# Patient Record
Sex: FEMALE | Race: BLACK OR AFRICAN AMERICAN | NOT HISPANIC OR LATINO | Employment: UNEMPLOYED | URBAN - METROPOLITAN AREA
[De-identification: names, ages, dates, MRNs, and addresses within clinical notes are randomized per-mention and may not be internally consistent; named-entity substitution may affect disease eponyms.]

---

## 2019-06-26 ENCOUNTER — CLINICAL SUPPORT (OUTPATIENT)
Dept: PULMONOLOGY | Facility: CLINIC | Age: 54
End: 2019-06-26
Payer: MEDICARE

## 2019-06-26 ENCOUNTER — TELEPHONE (OUTPATIENT)
Dept: PULMONOLOGY | Facility: CLINIC | Age: 54
End: 2019-06-26

## 2019-06-26 ENCOUNTER — HOSPITAL ENCOUNTER (OUTPATIENT)
Dept: RADIOLOGY | Facility: HOSPITAL | Age: 54
Discharge: HOME | End: 2019-06-26
Attending: INTERNAL MEDICINE
Payer: MEDICARE

## 2019-06-26 DIAGNOSIS — J45.909 ASTHMA, UNSPECIFIED ASTHMA SEVERITY, UNSPECIFIED WHETHER COMPLICATED, UNSPECIFIED WHETHER PERSISTENT: ICD-10-CM

## 2019-06-26 DIAGNOSIS — J45.909 ASTHMA, UNSPECIFIED ASTHMA SEVERITY, UNSPECIFIED WHETHER COMPLICATED, UNSPECIFIED WHETHER PERSISTENT: Primary | ICD-10-CM

## 2019-06-26 PROCEDURE — 71046 XR CHEST PA AND LATERAL: ICD-10-PCS | Mod: 26,,, | Performed by: RADIOLOGY

## 2019-06-26 PROCEDURE — 94010 BREATHING CAPACITY TEST: CPT | Mod: PBBFAC

## 2019-06-26 PROCEDURE — 94375 RESPIRATORY FLOW VOLUME LOOP: CPT | Mod: 26,S$PBB,, | Performed by: INTERNAL MEDICINE

## 2019-06-26 PROCEDURE — 94375 RESPIRATORY FLOW VOLUME LOOP: CPT | Mod: PBBFAC

## 2019-06-26 PROCEDURE — 94375 PR RESPIRATORY FLOW VOLUME LOOP: ICD-10-PCS | Mod: 26,S$PBB,, | Performed by: INTERNAL MEDICINE

## 2019-06-26 PROCEDURE — 71046 X-RAY EXAM CHEST 2 VIEWS: CPT | Mod: 26,,, | Performed by: RADIOLOGY

## 2019-06-26 PROCEDURE — 71046 X-RAY EXAM CHEST 2 VIEWS: CPT | Mod: TC

## 2019-06-27 LAB
BRPFT: NORMAL
FEF 25 75 LLN: 1.02
FEF 25 75 PRE REF: 96 %
FEF 25 75 REF: 2.13
FEV1 FVC LLN: 70
FEV1 FVC PRE REF: 99.9 %
FEV1 FVC REF: 81
FEV1 LLN: 1.53
FEV1 PRE REF: 88.2 %
FEV1 REF: 2.03
FVC LLN: 1.91
FVC PRE REF: 88.1 %
FVC REF: 2.51
PEF LLN: 3.73
PEF PRE REF: 114.2 %
PEF REF: 5.48
PRE FEF 25 75: 2.04 L/S (ref 1.02–3.24)
PRE FET 100: 8 SEC
PRE FEV1 FVC: 81.29 % (ref 70.33–92.36)
PRE FEV1: 1.79 L (ref 1.53–2.54)
PRE FVC: 2.21 L (ref 1.91–3.11)
PRE PEF: 6.26 L/S (ref 3.73–7.24)

## 2019-06-28 ENCOUNTER — LAB VISIT (OUTPATIENT)
Dept: LAB | Facility: HOSPITAL | Age: 54
End: 2019-06-28
Attending: INTERNAL MEDICINE
Payer: MEDICARE

## 2019-06-28 ENCOUNTER — OFFICE VISIT (OUTPATIENT)
Dept: PULMONOLOGY | Facility: CLINIC | Age: 54
End: 2019-06-28
Payer: MEDICARE

## 2019-06-28 VITALS
OXYGEN SATURATION: 95 % | RESPIRATION RATE: 18 BRPM | HEIGHT: 60 IN | HEART RATE: 60 BPM | BODY MASS INDEX: 24.94 KG/M2 | WEIGHT: 127 LBS | SYSTOLIC BLOOD PRESSURE: 134 MMHG | DIASTOLIC BLOOD PRESSURE: 80 MMHG

## 2019-06-28 DIAGNOSIS — R06.00 DYSPNEA AND RESPIRATORY ABNORMALITIES: Primary | ICD-10-CM

## 2019-06-28 DIAGNOSIS — I42.9 CARDIOMYOPATHY, UNSPECIFIED TYPE: ICD-10-CM

## 2019-06-28 DIAGNOSIS — R06.00 DYSPNEA AND RESPIRATORY ABNORMALITIES: ICD-10-CM

## 2019-06-28 DIAGNOSIS — G47.10 HYPERSOMNOLENCE DISORDER: ICD-10-CM

## 2019-06-28 DIAGNOSIS — J45.909 ASTHMA, UNSPECIFIED ASTHMA SEVERITY, UNSPECIFIED WHETHER COMPLICATED, UNSPECIFIED WHETHER PERSISTENT: ICD-10-CM

## 2019-06-28 DIAGNOSIS — Z90.5 SINGLE KIDNEY: ICD-10-CM

## 2019-06-28 DIAGNOSIS — J38.3 VOCAL CORD DYSFUNCTION: ICD-10-CM

## 2019-06-28 DIAGNOSIS — R06.89 DYSPNEA AND RESPIRATORY ABNORMALITIES: Primary | ICD-10-CM

## 2019-06-28 DIAGNOSIS — R06.89 DYSPNEA AND RESPIRATORY ABNORMALITIES: ICD-10-CM

## 2019-06-28 DIAGNOSIS — Z85.3 HISTORY OF LEFT BREAST CANCER: ICD-10-CM

## 2019-06-28 DIAGNOSIS — Z86.718 HISTORY OF DVT IN ADULTHOOD: ICD-10-CM

## 2019-06-28 PROBLEM — I10 HYPERTENSION: Status: ACTIVE | Noted: 2019-06-28

## 2019-06-28 LAB
BASOPHILS # BLD AUTO: 0.05 K/UL (ref 0–0.2)
BASOPHILS NFR BLD: 0.7 % (ref 0–1.9)
D DIMER PPP IA.FEU-MCNC: 0.32 MG/L FEU
DIFFERENTIAL METHOD: ABNORMAL
EOSINOPHIL # BLD AUTO: 0.1 K/UL (ref 0–0.5)
EOSINOPHIL NFR BLD: 1 % (ref 0–8)
ERYTHROCYTE [DISTWIDTH] IN BLOOD BY AUTOMATED COUNT: 13.7 % (ref 11.5–14.5)
HCT VFR BLD AUTO: 40.3 % (ref 37–48.5)
HGB BLD-MCNC: 12.5 G/DL (ref 12–16)
IGE SERPL-ACNC: <35 IU/ML (ref 0–100)
IMM GRANULOCYTES # BLD AUTO: 0.03 K/UL (ref 0–0.04)
IMM GRANULOCYTES NFR BLD AUTO: 0.4 % (ref 0–0.5)
LYMPHOCYTES # BLD AUTO: 1.9 K/UL (ref 1–4.8)
LYMPHOCYTES NFR BLD: 27.3 % (ref 18–48)
MCH RBC QN AUTO: 26.8 PG (ref 27–31)
MCHC RBC AUTO-ENTMCNC: 31 G/DL (ref 32–36)
MCV RBC AUTO: 86 FL (ref 82–98)
MONOCYTES # BLD AUTO: 0.6 K/UL (ref 0.3–1)
MONOCYTES NFR BLD: 8.3 % (ref 4–15)
NEUTROPHILS # BLD AUTO: 4.3 K/UL (ref 1.8–7.7)
NEUTROPHILS NFR BLD: 62.7 % (ref 38–73)
NRBC BLD-RTO: 0 /100 WBC
PLATELET # BLD AUTO: 222 K/UL (ref 150–350)
PMV BLD AUTO: 10.3 FL (ref 9.2–12.9)
RBC # BLD AUTO: 4.67 M/UL (ref 4–5.4)
WBC # BLD AUTO: 6.85 K/UL (ref 3.9–12.7)

## 2019-06-28 PROCEDURE — 99999 PR PBB SHADOW E&M-EST. PATIENT-LVL IV: CPT | Mod: PBBFAC,,, | Performed by: INTERNAL MEDICINE

## 2019-06-28 PROCEDURE — 85025 COMPLETE CBC W/AUTO DIFF WBC: CPT

## 2019-06-28 PROCEDURE — 82785 ASSAY OF IGE: CPT

## 2019-06-28 PROCEDURE — 99214 OFFICE O/P EST MOD 30 MIN: CPT | Mod: PBBFAC | Performed by: INTERNAL MEDICINE

## 2019-06-28 PROCEDURE — 99205 PR OFFICE/OUTPT VISIT, NEW, LEVL V, 60-74 MIN: ICD-10-PCS | Mod: S$PBB,,, | Performed by: INTERNAL MEDICINE

## 2019-06-28 PROCEDURE — 99999 PR PBB SHADOW E&M-EST. PATIENT-LVL IV: ICD-10-PCS | Mod: PBBFAC,,, | Performed by: INTERNAL MEDICINE

## 2019-06-28 PROCEDURE — 85379 FIBRIN DEGRADATION QUANT: CPT

## 2019-06-28 PROCEDURE — 36415 COLL VENOUS BLD VENIPUNCTURE: CPT

## 2019-06-28 PROCEDURE — 99205 OFFICE O/P NEW HI 60 MIN: CPT | Mod: S$PBB,,, | Performed by: INTERNAL MEDICINE

## 2019-06-28 RX ORDER — BUDESONIDE AND FORMOTEROL FUMARATE DIHYDRATE 160; 4.5 UG/1; UG/1
2 AEROSOL RESPIRATORY (INHALATION) EVERY 12 HOURS
COMMUNITY

## 2019-06-28 RX ORDER — ATORVASTATIN CALCIUM 20 MG/1
20 TABLET, FILM COATED ORAL
COMMUNITY

## 2019-06-28 RX ORDER — DULOXETIN HYDROCHLORIDE 20 MG/1
20 CAPSULE, DELAYED RELEASE ORAL DAILY
COMMUNITY

## 2019-06-28 RX ORDER — HYDROCODONE BITARTRATE AND ACETAMINOPHEN 10; 325 MG/1; MG/1
1 TABLET ORAL EVERY 6 HOURS PRN
COMMUNITY
Start: 2017-09-04

## 2019-06-28 RX ORDER — LEVALBUTEROL INHALATION SOLUTION 1.25 MG/3ML
SOLUTION RESPIRATORY (INHALATION)
Refills: 0 | COMMUNITY
Start: 2019-06-06 | End: 2020-03-31 | Stop reason: SDUPTHER

## 2019-06-28 RX ORDER — GABAPENTIN 100 MG/1
CAPSULE ORAL
COMMUNITY

## 2019-06-28 RX ORDER — LEVALBUTEROL TARTRATE 45 UG/1
1-2 AEROSOL, METERED ORAL EVERY 4 HOURS PRN
COMMUNITY
End: 2020-03-31 | Stop reason: SDUPTHER

## 2019-06-28 NOTE — PROGRESS NOTES
Referring Provider:    Marcial Hart Md  [address] [facility] [place] [address]  Subjective:   Patient: Sole Gomez [id number], :1965   Visit date:2019 11:44 AM    Chief Complaint:  Other (pt was referred by dr Hart for possible vocal cord disfunction.)    HPI:  Sole is a 53 y.o. female who I was asked to see in consultation for evaluation of chronic throat discomfort:      Severity: moderate  Quality: dull  Voice Quality: hoarse, weak  Duration: 5 year(s)  Modifying factors:  None  Associated signs and symptoms:  Dysphagia - liquids/ heartburn  Post nasal drip or significant rhinorrhea:  No  Bad taste in the back of the mouth: No  Heartburn: Yes - takes otc tums  Number of cups of caffeinated beverages daily: 0  Number of alcoholic beverages daily: 0  Smoking: No  Prior medical therapy:    - None - which has been ineffective.    C/o dysphagia - regurgitates liquids up  C/o chronic hoarseness and weakness in her voice, sometimes worse in AM. She does not sing (in a choir)  Never smoked  Reported dyspnea with exertion       Vocal cord dysfunction       Has been told of diagnosis by ENT  Went to Speech therapy  > 5 years ago          Review of Systems:  Negative unless checked off.  Gen:  []fever   []fatigue  HENT:  []nosebleeds  []dental problem   Eyes:  []photophobia  []visual disturbance  Resp:  []chest tightness []wheezing  Card:  []chest pain  []leg swelling  GI:  []abdominal pain []blood in stool  :  []dysuria  []hematuria  Musc:  []joint swelling  []gait problem  Skin:  []color change  []pallor  Neuro:  []seizures  []numbness  Hem:  []bruise/bleed easily  Psych:  []hallucinations  []behavioral problems  Allergy/Imm: is allergic to albuterol sulfate; ondansetron hcl (pf); and oxycodone-acetaminophen.    Her meds, allergies, medical, surgical, social & family histories were reviewed & updated:  -     She has a current medication list which includes the following  prescription(s): atorvastatin, budesonide-formoterol 160-4.5 mcg, duloxetine, gabapentin, hydrocodone-acetaminophen, levalbuterol, levalbuterol, and pantoprazole.  -     She  has a past medical history of Asthma.   -     She does not have any pertinent problems on file.   -     She  has no past surgical history on file.  -     She  reports that she has never smoked. She does not have any smokeless tobacco history on file. She reports that she does not drink alcohol.  -     Her family history includes Cancer in her father; Scleroderma in her mother.  -     She is allergic to albuterol sulfate; ondansetron hcl (pf); and oxycodone-acetaminophen.    Objective:   Physical Exam:  Vitals:  /76   Pulse 78   Temp 97.5 °F (36.4 °C) (Tympanic)   Wt 57.4 kg (126 lb 8.7 oz)   BMI 24.71 kg/m²   General appearance:  Well developed, well nourished    Eyes:  Extraocular motions intact, PERRL    Communication:  no hoarseness, no dysphonia    Ears:  Otoscopy of external auditory canals and tympanic membranes was normal, clinical speech reception thresholds grossly intact, no mass/lesion of auricle.  Nose:  No masses/lesions of external nose, nasal mucosa, septum, and turbinates were within normal limits.  Mouth:  No mass/lesion of lips, teeth, gums, hard/soft palate, tongue, tonsils, or oropharynx.    Cardiovascular:  No pedal edema; Radial Pulses +2     Neck & Lymphatics:  No cervical lymphadenopathy, no neck mass/crepitus/ asymmetry, trachea is midline, no thyroid enlargement/tenderness/mass.    Psych: Oriented x3,  Alert with normal mood and affect.     Respiration/Chest:  Symmetric expansion during respiration, normal respiratory effort.    Skin:  Warm and intact. No ulcerations of face, scalp, neck.      Assessment & Plan:       Laryngoscopic exam insignificant.   Trial with Protonix QD (may continue tums prn)  GERD Diet handout given in clinic  MBSS - I will contact pt with results and further tx recommendations  Keep  appt with Dr. Hart for pulm fxn test (pt has been dx with asthma)  Consider future consult with Dr. Meyer in laryngology in [facility].     Over 25 minutes were spent in face to face contact with the patient with greater than 50% spent discussing their diagnosis and coordination of care.       Marsha Sterling PA-C  [facility]   [facility]  [address] [place] [address]  P: [phone]  F: [phone]      -------------------------------------------------------------------------------------------------------------------    Endoscopic Exam:  Patient: Sole Gomez [id number], :1965  Procedure date:2019  Patient's medications, allergies, past medical, surgical, social and family histories were reviewed and updated as appropriate.  Chief Complaint:  Other (pt was referred by dr Hart for possible vocal cord disfunction.)    HPI:  Sole is a 53 y.o. female with the history of present illness as discussed in the clinic note from today.    Procedure: Risks, benefits, and alternatives of the procedure were discussed with the patient, and the patient consented to the nasal endoscopy.  The nasal cavity was sprayed with a topical decongestant and anesthetic (if needed). The endoscope was passed into each nostril and each nasal cavity was visualized.  On each side the nasal cavity, sinuses (if open), turbinates, and septum were examined with the findings described below. At the end of the examination, the scope was removed. The patient tolerated the procedure well with no complications.       Endoscopic Exam Findings:  -     The right side has normal mucosa  -     The left side has normal mucosa  -     Nasal secretions: No discolored secretions noted bilaterally  -     Nasal septum: RIGHT mild deviation   -     Inferior turbinate: hypertrophy or edema (Mild) bilaterally  -     Middle turbinate: Normal mucosa without significant hypertrophy bilaterally  -      Other findings: No mass or obstructive lesion      -     Laryngeal mucosa is normal  -     Posterior commissure has mild  hypertrophy  -     Lingual tonsils have no  hypertrophy  -     Adenoids have no hypertrophy  -     Right vocal fold: normal mobility     mass/lesion: none  -     Left vocal fold: normal mobility     mass/lesion: none  -     Other findings: none    Assessment & Plan:  - see today's clinic note       KIERSTEN Carter-

## 2019-06-28 NOTE — PROGRESS NOTES
Subjective:       Patient ID: Sole Gomez is a 53 y.o. female.    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  Quit working due to Day time sleepiness driving. Denies snoring  Wakes frequently at night  Works as : no occupational exposures  Triggers: heat, Stress, perfumes, dust  No Hx of TB  Hx of DVT  [place] sleepiness Score 15  Sleep disorder questionnaire reviewed.  Bedtime 10:00 p.m. wake-up time 7:00 a.m..  Sleep latency 30 min.  Lower legs may feel odd and restless and must move them.  Frequent indigestion at night.  Often exercise vigorously close to bedtime.  Endorses having lots of coughing at night.  Difficulty falling asleep at night.  Wake up early and cannot fall back asleep.  Sleep to little.  Struggle to stay awake during the daytime.    STOP - BANG Questionnaire:     1. Snoring : Do you snore loudly ?    No    2. Tired : Do you often feel tired, fatigued, or sleepy during daytime? Yes    3. Observed: Has anyone observed you stop breathing during your sleep?   No     4. Blood pressure : Do you have or are you being treated for high blood pressure?   No    5. BMI :BMI more than 35 kg/m2?   No    6. Age : Age over 50 yr old?   Yes    7. Neck circumference: Neck circumference greater than 40 cm?   No    8. Gender: Gender male?   No    4  Low risk of NETO: Yes  2      References:   STOP Questionnaire   A Tool to Screen Patients for Obstructive Sleep Apnea: ELENI Vaughn., Taran Jolley M.B.B.S., Carlos Hernández M.D.,Adilene Banegas, Ph.D., LIZZIE Barrientos.B.B.S.,_  Deidra Martinez.,_ Chava Esteban M.D., KAITLIN BowdenC.P.C.; Anesthesiology 2008; 108:812-21 Copyright © 2008, the American Society of Anesthesiologists, Inc. [employer]            The following portions of the patient's history were reviewed and updated as appropriate:   She  has a past medical history of Asthma.  She does not have any pertinent problems on file.  She  has no past surgical history on file.  Her family history includes Cancer in her father; Scleroderma in her mother.  She  reports that she has never smoked. She does not have any smokeless tobacco history on file. She reports that she does not drink alcohol. Her drug history is not on file.  She has a current medication list which includes the following prescription(s): atorvastatin, budesonide-formoterol 160-4.5 mcg, duloxetine, gabapentin, hydrocodone-acetaminophen, levalbuterol, and levalbuterol.  Current Outpatient Medications on File Prior to Visit   Medication Sig Dispense Refill    atorvastatin (LIPITOR) 20 MG tablet Take 20 mg by mouth.      budesonide-formoterol 160-4.5 mcg (SYMBICORT) 160-4.5 mcg/actuation HFAA Inhale 2 puffs into the lungs every 12 (twelve) hours. Controller      DULoxetine (CYMBALTA) 20 MG capsule Take 20 mg by mouth once daily.      gabapentin (NEURONTIN) 100 MG capsule       HYDROcodone-acetaminophen (NORCO)  mg per tablet Take 1 tablet by mouth every 6 (six) hours as needed.      levalbuterol (XOPENEX HFA) 45 mcg/actuation inhaler Inhale 1-2 puffs into the lungs every 4 (four) hours as needed for Wheezing. Rescue      levalbuterol (XOPENEX) 1.25 mg/3 mL nebulizer solution   0     No current facility-administered medications on file prior to visit.      She is allergic to albuterol sulfate; ondansetron hcl (pf); and oxycodone-acetaminophen..    Review of Systems   Review of Systems   Constitutional: Positive for malaise/fatigue.   HENT: Positive for sore throat.          Voice change   Eyes: Negative.    Respiratory: Positive for shortness of breath.    Cardiovascular: Negative.    Gastrointestinal: Negative.    Genitourinary: Negative.    Musculoskeletal: Negative.    Neurological: Negative.    Endo/Heme/Allergies: Negative.    Psychiatric/Behavioral: The patient is nervous/anxious.    All other systems reviewed and are negative.       Objective:     Vitals:    06/28/19 0829   BP: 134/80   Pulse: 60   Resp: 18   SpO2: 95%   Weight: 57.6 kg (126 lb 15.8 oz)   Height: 5' (1.524 m)     Physical Exam   Constitutional: She is oriented to person, place, and time. She appears well-developed and well-nourished.   HENT:   Head: Normocephalic and atraumatic.   Nose: Nose normal.   malampati score 3   Eyes: Pupils are equal, round, and reactive to light. Conjunctivae and EOM are normal.   Neck: Normal range of motion. Neck supple.   Cardiovascular: Normal rate, regular rhythm and normal heart sounds.   No murmur heard.  Pulmonary/Chest: Effort normal and breath sounds normal. No stridor. No respiratory distress. She has no wheezes. She has no rales.   Abdominal: Soft. Bowel sounds are normal.   Musculoskeletal: Normal range of motion. She exhibits no edema.   Neurological: She is alert and oriented to person, place, and time. No cranial nerve deficit.   Skin: Skin is warm and dry. Capillary refill takes 2 to 3 seconds.   Nursing note and vitals reviewed.        Data Review: CBC: No results found for: WBC, RBC, HGB, HCT, PLT  BMP: No results found for: GLU, NA, K, CL, CO2, BUN, CREATININE, CALCIUM  Radiology review:    Diagnostics: Chest x-ray:  Two view chest x-ray performed today was normal.  Pulmonary function tests: FEV1: 1.79L  (88.2 % predicted), FVC:  2.21L (88.1 % predicted), FEV1/FVC:  81     Normal spirometry  Assessment:      Problem List Items Addressed This Visit     Asthma    Relevant Orders    Ambulatory Referral to ENT    CBC auto differential    IGE    Vocal cord dysfunction      Has been told of diagnosis by ENT  Went to Speech therapy  > 5 years ago         Relevant Orders    Ambulatory Referral to ENT    History of left breast cancer     Chemo XRT 7 years ago  Chemo related cardimyopathy         Cardiomyopathy    Relevant Orders    Polysomnography with mslt    Single kidney     2011< nephrectomy         History of DVT in adulthood     Right legs  Treated with Eliquis 6 months           Other Visit Diagnoses     Dyspnea and respiratory abnormalities    -  Primary    Relevant Orders    D dimer, quantitative    Complete PFT without bronchodilator - Clinic    Stress test, pulmonary    Hypersomnolence disorder        Relevant Orders    Polysomnography with mslt         Plan:      Main concerns Fatigue and SOB and hoarse vocie: suggest VCD: need restablish speech therapy  ENT reeval  Day time sleepiness, may be meds However need to eval: PSG MSLT  SOB; check dimer if +ve CTA chest, [facility], needs to bring disc      Follow up in about 6 weeks (around 8/9/2019), or DONALD Montemayor, Copy of PFT from Dr Pollard. Dimer, CBC, PFT, 6MWD, , for Referal to ENT: get CD disc chest CT [facility].    This note was prepared using voice recognition system and is likely to have sound alike errors that may have been overlooked even after proof reading.  Please call me with any questions    Discussed diagnosis, its evaluation, treatment and usual course. All questions answered.    Thank you for the courtesy of participating in the care of this patient    Marcial Hart MD      Component      Latest Ref Rng & Units 6/28/2019   WBC      3.90 - 12.70 K/uL 6.85   RBC      4.00 - 5.40 M/uL 4.67   Hemoglobin      12.0 - 16.0 g/dL 12.5   Hematocrit      37.0 - 48.5 % 40.3   MCV      82 - 98 fL 86   MCH      27.0 - 31.0 pg 26.8 (L)   MCHC      32.0 - 36.0 g/dL 31.0 (L)   RDW      11.5 - 14.5 % 13.7   Platelets      150 - 350 K/uL 222   MPV      9.2 - 12.9 fL 10.3   Immature  Granulocytes      0.0 - 0.5 % 0.4   Gran # (ANC)      1.8 - 7.7 K/uL 4.3   Immature Grans (Abs)      0.00 - 0.04 K/uL 0.03   Lymph #      1.0 - 4.8 K/uL 1.9   Mono #      0.3 - 1.0 K/uL 0.6   Eos #      0.0 - 0.5 K/uL 0.1   Baso #      0.00 - 0.20 K/uL 0.05   nRBC      0 /100 WBC 0   Gran%      38.0 - 73.0 % 62.7   Lymph%      18.0 - 48.0 % 27.3   Mono%      4.0 - 15.0 % 8.3   Eosinophil%      0.0 - 8.0 % 1.0   Basophil%      0.0 - 1.9 % 0.7   Differential Method       Automated   D-Dimer      <0.50 mg/L FEU 0.32       PFTs performed 02/18/2019  FEV1 1.82 (93% predicted) FVC 2.22 (91% predicted) FEV1 /FVC 82.  MVV was 80 3% predicted,   TLC was 2.55 (65% predicted) DLCO was 15.42 (77% predicted)  Interpretation  Moderate restriction, reduced diffusion capacity

## 2019-06-28 NOTE — PATIENT INSTRUCTIONS
Your provider has scheduled you for a sleep study.   You should be receiving a phone call from the sleep lab shortly after your study has been approved by your insurance. Please make sure you have your current phone numbers in the [facility]. If you do not hear from anyone in the next 10 business days, please call the sleep lab at [phone] to schedule your sleep study. The sleep studies are performed at [facility] seven nights a week.  When you are scheduling your sleep study, they will also make you a follow up appointment with your provider. This follow up appointment will be 10-14 days after your sleep study to review the results. If it is noted that you do have sleep apnea on your initial sleep study, you may receive a call back for a second night study with the CPAP before you come back to the office.

## 2019-07-01 ENCOUNTER — OFFICE VISIT (OUTPATIENT)
Dept: OTOLARYNGOLOGY | Facility: CLINIC | Age: 54
End: 2019-07-01
Payer: MEDICARE

## 2019-07-01 VITALS
HEART RATE: 78 BPM | SYSTOLIC BLOOD PRESSURE: 119 MMHG | DIASTOLIC BLOOD PRESSURE: 76 MMHG | TEMPERATURE: 98 F | WEIGHT: 126.56 LBS | BODY MASS INDEX: 24.71 KG/M2

## 2019-07-01 DIAGNOSIS — R13.10 DYSPHAGIA, UNSPECIFIED TYPE: Primary | ICD-10-CM

## 2019-07-01 DIAGNOSIS — J45.909 ASTHMA, UNSPECIFIED ASTHMA SEVERITY, UNSPECIFIED WHETHER COMPLICATED, UNSPECIFIED WHETHER PERSISTENT: ICD-10-CM

## 2019-07-01 DIAGNOSIS — K21.9 GASTROESOPHAGEAL REFLUX DISEASE, ESOPHAGITIS PRESENCE NOT SPECIFIED: ICD-10-CM

## 2019-07-01 DIAGNOSIS — R49.0 HOARSENESS OF VOICE: ICD-10-CM

## 2019-07-01 PROCEDURE — 99204 PR OFFICE/OUTPT VISIT, NEW, LEVL IV, 45-59 MIN: ICD-10-PCS | Mod: 25,S$PBB,, | Performed by: PHYSICIAN ASSISTANT

## 2019-07-01 PROCEDURE — 99213 OFFICE O/P EST LOW 20 MIN: CPT | Mod: PBBFAC | Performed by: PHYSICIAN ASSISTANT

## 2019-07-01 PROCEDURE — 31575 PR LARYNGOSCOPY, FLEXIBLE; DIAGNOSTIC: ICD-10-PCS | Mod: S$PBB,,, | Performed by: PHYSICIAN ASSISTANT

## 2019-07-01 PROCEDURE — 99999 PR PBB SHADOW E&M-EST. PATIENT-LVL III: CPT | Mod: PBBFAC,,, | Performed by: PHYSICIAN ASSISTANT

## 2019-07-01 PROCEDURE — 99999 PR PBB SHADOW E&M-EST. PATIENT-LVL III: ICD-10-PCS | Mod: PBBFAC,,, | Performed by: PHYSICIAN ASSISTANT

## 2019-07-01 PROCEDURE — 31575 DIAGNOSTIC LARYNGOSCOPY: CPT | Mod: S$PBB,,, | Performed by: PHYSICIAN ASSISTANT

## 2019-07-01 PROCEDURE — 31575 DIAGNOSTIC LARYNGOSCOPY: CPT | Mod: PBBFAC | Performed by: PHYSICIAN ASSISTANT

## 2019-07-01 PROCEDURE — 99204 OFFICE O/P NEW MOD 45 MIN: CPT | Mod: 25,S$PBB,, | Performed by: PHYSICIAN ASSISTANT

## 2019-07-01 RX ORDER — PANTOPRAZOLE SODIUM 40 MG/1
40 TABLET, DELAYED RELEASE ORAL DAILY
Qty: 30 TABLET | Refills: 11 | Status: SHIPPED | OUTPATIENT
Start: 2019-07-01 | End: 2020-06-30

## 2019-07-01 NOTE — PATIENT INSTRUCTIONS
GASTROESOPHAGEAL REFLUX DISORDER    Definition:  Gastroesophageal reflux disease (GERD) is a condition in which the stomach contents (food or liquid) leak backwards from the stomach into the esophagus (the tube from the mouth to the stomach). This action can irritate the esophagus causing heartburn and other symptoms.     ENT Symptoms:   Hoarseness   Difficulty swallowing   Cough   Sore throat   Feeling of fullness or a lump in the throat    GI Symptoms:   Heartburn   Regurgitation   Chest pain   Difficulty swallowing    Symptom Relief:   1. Raise the head of your bed. Use six-inch blocks or a wedge. Extra pillows alone will not help.   2. Do not eat before going to bed for at least 2-3 hours.   3. Eat small meals instead of large meals.   4. Weight loss   5. Smoking cessation   6. Drink less alcohol  7. Do not eat foods that may or are causing heart burn (see list below)      FOOD GROUP FOODS TO AVOID   Fruit Orange juice  Lemon/ lemonade  Grapefruit juice  Cranberry juice  Tomato   Vegetables Mashed potatoes  French fries  Raw onion   Meat Ground beef/ brandyn  Marbled sirloin  Chicken nuggets  [place] wings   Dairy Sour cream  Milk shake  Ice cream  Cottage cheese   Grains Macaroni and cheese  Spaghetti with sauce   Beverages Liquor  Wine  Coffee  Tea   Fats/ Oils Any fried foods   Sweets/ Desserts Butter cookie  Brownie  Chocolate  Donut  Corn chips  Potato chips

## 2019-07-01 NOTE — LETTER
July 1, 2019      Marcial Hart MD  [address] [facility] [address]           [facility]  [address]  [place] [address]  Phone: [phone]  Fax: [phone]          Patient: Sole Gomez   MR Number: [id number]   YOB: 1965   Date of Visit: 7/1/2019       Dear Dr. Marcial Hart:    Thank you for referring Sole Gomez to me for evaluation. Attached you will find relevant portions of my assessment and plan of care.    If you have questions, please do not hesitate to call me. I look forward to following Sole Gomez along with you.    Sincerely,    Marsha Sterling PA-C    Enclosure  CC:  No Recipients    If you would like to receive this communication electronically, please contact [email or web address] or [phone] to request more information on [employer] access.    For providers and/or their staff who would like to refer a patient to [facility], please contact us through our one-stop-shop provider referral line, [facility] , at [phone].    If you feel you have received this communication in error or would no longer like to receive these types of communications, please e-mail [email or web address]

## 2019-07-08 ENCOUNTER — HOSPITAL ENCOUNTER (OUTPATIENT)
Dept: RADIOLOGY | Facility: HOSPITAL | Age: 54
Discharge: HOME | End: 2019-07-08
Attending: PHYSICIAN ASSISTANT
Payer: MEDICARE

## 2019-07-08 ENCOUNTER — TELEPHONE (OUTPATIENT)
Dept: OTOLARYNGOLOGY | Facility: CLINIC | Age: 54
End: 2019-07-08

## 2019-07-08 DIAGNOSIS — R13.10 DYSPHAGIA, UNSPECIFIED TYPE: ICD-10-CM

## 2019-07-08 PROCEDURE — 74230 X-RAY XM SWLNG FUNCJ C+: CPT | Mod: TC

## 2019-07-08 PROCEDURE — A9698 NON-RAD CONTRAST MATERIALNOC: HCPCS | Performed by: PHYSICIAN ASSISTANT

## 2019-07-08 PROCEDURE — 25500020 PHARM REV CODE 255: Performed by: PHYSICIAN ASSISTANT

## 2019-07-08 PROCEDURE — 92611 MOTION FLUOROSCOPY/SWALLOW: CPT

## 2019-07-08 RX ADMIN — BARIUM SULFATE 75 G: 960 POWDER, FOR SUSPENSION ORAL at 10:07

## 2019-07-08 NOTE — TELEPHONE ENCOUNTER
Spoke with pt and informed of results pt verbalized understanding and will call to make an appointment with Dr Doyle in [place].        ----- Message from Kita Sanchez sent at 7/8/2019  1:50 PM CDT -----  Contact: [phone]  Would like to consult with the nurse, patient is returning the nurse call, please call back at [phone], thanks sj  .Type:  Patient Returning Call    Who Called: Ms Gomez  Who Left Message for Patient:Mona  Does the patient know what this is regarding?:no  Would the patient rather a call back or a response via [facility]? Call back  Best Call Back Number:[phone]  Additional Information:

## 2019-07-08 NOTE — PROCEDURES
Modified Barium Swallow    Patient Name:  Sole Gomez   MRN:  [id number]      Recommendations:     Recommendations:                General Recommendations:  Follow-up not indicated  Diet recommendations:   ,   regular/ thin liquids  Aspiration Precautions: 1 bite/sip at a time, Alternating bites/sips, HOB to 90 degrees and Small bites/sips   General Precautions: Standard,    Communication strategies:  none    Referral     Reason for Referral  Patient was referred for a Modified Barium Swallow Study to assess the efficiency of his/her swallow function, rule out aspiration and make recommendations regarding safe dietary consistencies, effective compensatory strategies, and safe eating environment.     Diagnosis: <principal problem not specified>       History:     Past Medical History:   Diagnosis Date    Asthma        Objective:     Current Respiratory Status: 07/08/19    Alert: yes    Cooperative: yes    Follows Directions: yes    Visualization  · Patient was seen in the lateral view    Oral Peripheral Examination  ·      Consistencies Assessed  · Thin liquids, puree, solids    Oral Preparation/Oral Phase  · WFL- Pt with adequate bolus acceptance, containment, control and timely A-P transfer across consistencies     Pharyngeal Phase   WFL  No penetration or aspiration    Cervical Esophageal Phase  · Residue which clears slowly    Assessment:     Impressions  ·  Patient with oral and pharyngeal phases of swallowing deemed WNL. Esophageal dysphagia characterized by residue in upper 1/3 of esophagus that is slow to clear.    Prognosis: Excellent    Barriers:  · None    Plan  Compensatory strategies    Education  Results were discussed with patient.    Goals:       Plan:   · Patient to be seen:      · Plan of Care expires:     · Plan of Care reviewed with:           Discharge recommendations:      Barriers to Discharge:  None    Time Tracking:   SLP Treatment Date:   07/08/19  Speech Start Time:     Speech  Stop Time:        Speech Total Time (min):       Kate Kerr CCC-SLP  07/08/2019

## 2019-07-08 NOTE — TELEPHONE ENCOUNTER
Attempted to contact pt multiple times from number listed in chart, no answer and did not have voicemail set up to leave message to return call.      ----- Message from Marsha Sterling PA-C sent at 7/8/2019 11:39 AM CDT -----  Please let pt know her swallow study overall looks good. If she continues to have a hoarse voice (she is a fuentes) I would recommend a consult with Dr. Doyle in laryngology in [facility]. Thank you!

## 2019-10-04 ENCOUNTER — CLINICAL SUPPORT (OUTPATIENT)
Dept: PULMONOLOGY | Facility: CLINIC | Age: 54
End: 2019-10-04
Payer: MEDICARE

## 2019-10-04 ENCOUNTER — OFFICE VISIT (OUTPATIENT)
Dept: PULMONOLOGY | Facility: CLINIC | Age: 54
End: 2019-10-04
Payer: MEDICARE

## 2019-10-04 VITALS
OXYGEN SATURATION: 97 % | BODY MASS INDEX: 24.88 KG/M2 | SYSTOLIC BLOOD PRESSURE: 103 MMHG | DIASTOLIC BLOOD PRESSURE: 73 MMHG | WEIGHT: 126.75 LBS | HEIGHT: 60 IN | HEART RATE: 80 BPM | RESPIRATION RATE: 16 BRPM

## 2019-10-04 VITALS — WEIGHT: 126.75 LBS | BODY MASS INDEX: 24.88 KG/M2 | HEIGHT: 60 IN

## 2019-10-04 DIAGNOSIS — R06.00 DYSPNEA AND RESPIRATORY ABNORMALITIES: ICD-10-CM

## 2019-10-04 DIAGNOSIS — J38.3 VOCAL CORD DYSFUNCTION: ICD-10-CM

## 2019-10-04 DIAGNOSIS — R94.2 ABNORMAL DIFFUSION CAPACITY DETERMINED BY PULMONARY FUNCTION TEST: ICD-10-CM

## 2019-10-04 DIAGNOSIS — R06.89 DYSPNEA AND RESPIRATORY ABNORMALITIES: ICD-10-CM

## 2019-10-04 DIAGNOSIS — I10 ESSENTIAL HYPERTENSION: ICD-10-CM

## 2019-10-04 DIAGNOSIS — K91.1 DUMPING SYNDROME: ICD-10-CM

## 2019-10-04 DIAGNOSIS — J45.30 MILD PERSISTENT ASTHMA WITHOUT COMPLICATION: Primary | ICD-10-CM

## 2019-10-04 PROBLEM — D64.9 ANEMIA: Status: ACTIVE | Noted: 2019-10-04

## 2019-10-04 LAB
BRPFT: ABNORMAL
DLCO ADJ PRE: 10.49 ML/(MIN*MMHG) (ref 15.6–27.07)
DLCO SINGLE BREATH LLN: 15.6
DLCO SINGLE BREATH PRE REF: 49.2 %
DLCO SINGLE BREATH REF: 21.34
DLCOC SBVA LLN: 3.24
DLCOC SBVA PRE REF: 107.2 %
DLCOC SBVA REF: 5.03
DLCOC SINGLE BREATH LLN: 15.6
DLCOC SINGLE BREATH PRE REF: 49.2 %
DLCOC SINGLE BREATH REF: 21.34
DLCOVA LLN: 3.24
DLCOVA PRE REF: 107.2 %
DLCOVA PRE: 5.39 ML/(MIN*MMHG*L) (ref 3.24–6.82)
DLCOVA REF: 5.03
DLVAADJ PRE: 5.39 ML/(MIN*MMHG*L) (ref 3.24–6.82)
ERV LLN: 0.87
ERV PRE REF: 91.2 %
ERV REF: 0.87
FEF 25 75 LLN: 1.27
FEF 25 75 PRE REF: 41.3 %
FEF 25 75 REF: 3.12
FEV1 FVC LLN: 70
FEV1 FVC PRE REF: 90.3 %
FEV1 FVC REF: 81
FEV1 LLN: 1.52
FEV1 PRE REF: 84.4 %
FEV1 REF: 2.03
FRCPLETH LLN: 1.63
FRCPLETH PREREF: 86 %
FRCPLETH REF: 2.46
FVC LLN: 1.9
FVC PRE REF: 93.4 %
FVC REF: 2.5
IVC PRE: 1.35 L (ref 1.9–3.1)
IVC SINGLE BREATH LLN: 1.9
IVC SINGLE BREATH PRE REF: 54.2 %
IVC SINGLE BREATH REF: 2.5
MVV LLN: 72
MVV PRE REF: 91.5 %
MVV REF: 84
PEF LLN: 3.68
PEF PRE REF: 70 %
PEF REF: 5.43
PRE DLCO: 10.49 ML/(MIN*MMHG) (ref 15.6–27.07)
PRE ERV: 0.8 L (ref 0.87–0.87)
PRE FEF 25 75: 1.29 L/S (ref 1.27–4.96)
PRE FET 100: 8.56 SEC
PRE FEV1 FVC: 73.39 % (ref 70.28–92.35)
PRE FEV1: 1.71 L (ref 1.52–2.53)
PRE FRC PL: 2.11 L
PRE FVC: 2.33 L (ref 1.9–3.1)
PRE MVV: 77 L/MIN (ref 71.54–96.78)
PRE PEF: 3.8 L/S (ref 3.68–7.18)
PRE RV: 0.68 L (ref 1.01–2.16)
PRE TLC: 3.01 L (ref 3.25–5.23)
RAW LLN: 3.06
RAW PRE REF: 96.4 %
RAW PRE: 2.95 CMH2O*S/L (ref 3.06–3.06)
RAW REF: 3.06
RV LLN: 1.01
RV PRE REF: 42.9 %
RV REF: 1.58
RVTLC LLN: 27
RVTLC PRE REF: 61.5 %
RVTLC PRE: 22.54 % (ref 27.05–46.23)
RVTLC REF: 37
TLC LLN: 3.25
TLC PRE REF: 71 %
TLC REF: 4.24
VA PRE: 1.95 L (ref 4.09–4.09)
VA SINGLE BREATH LLN: 4.09
VA SINGLE BREATH PRE REF: 47.6 %
VA SINGLE BREATH REF: 4.09
VC LLN: 1.9
VC PRE REF: 93.4 %
VC PRE: 2.33 L (ref 1.9–3.1)
VC REF: 2.5
VTGRAWPRE: 2.28 L

## 2019-10-04 PROCEDURE — 94729 PR C02/MEMBANE DIFFUSE CAPACITY: ICD-10-PCS | Mod: 26,S$PBB,, | Performed by: INTERNAL MEDICINE

## 2019-10-04 PROCEDURE — 94726 PLETHYSMOGRAPHY LUNG VOLUMES: CPT | Mod: 26,S$PBB,, | Performed by: INTERNAL MEDICINE

## 2019-10-04 PROCEDURE — 99999 PR PBB SHADOW E&M-EST. PATIENT-LVL IV: CPT | Mod: PBBFAC,,, | Performed by: INTERNAL MEDICINE

## 2019-10-04 PROCEDURE — 94618 PULMONARY STRESS TESTING: CPT | Mod: PBBFAC

## 2019-10-04 PROCEDURE — 94726 PLETHYSMOGRAPHY LUNG VOLUMES: CPT | Mod: PBBFAC

## 2019-10-04 PROCEDURE — 99214 OFFICE O/P EST MOD 30 MIN: CPT | Mod: 25,S$PBB,, | Performed by: INTERNAL MEDICINE

## 2019-10-04 PROCEDURE — 94618 PULMONARY STRESS TESTING: CPT | Mod: 26,S$PBB,, | Performed by: INTERNAL MEDICINE

## 2019-10-04 PROCEDURE — 94618 PULMONARY STRESS TESTING: ICD-10-PCS | Mod: 26,S$PBB,, | Performed by: INTERNAL MEDICINE

## 2019-10-04 PROCEDURE — 94010 BREATHING CAPACITY TEST: CPT | Mod: PBBFAC

## 2019-10-04 PROCEDURE — 99999 PR PBB SHADOW E&M-EST. PATIENT-LVL IV: ICD-10-PCS | Mod: PBBFAC,,, | Performed by: INTERNAL MEDICINE

## 2019-10-04 PROCEDURE — 94726 PULM FUNCT TST PLETHYSMOGRAP: ICD-10-PCS | Mod: 26,S$PBB,, | Performed by: INTERNAL MEDICINE

## 2019-10-04 PROCEDURE — 94729 DIFFUSING CAPACITY: CPT | Mod: 26,S$PBB,, | Performed by: INTERNAL MEDICINE

## 2019-10-04 PROCEDURE — 99214 PR OFFICE/OUTPT VISIT, EST, LEVL IV, 30-39 MIN: ICD-10-PCS | Mod: 25,S$PBB,, | Performed by: INTERNAL MEDICINE

## 2019-10-04 PROCEDURE — 94010 BREATHING CAPACITY TEST: CPT | Mod: 26,59,S$PBB, | Performed by: INTERNAL MEDICINE

## 2019-10-04 PROCEDURE — 99214 OFFICE O/P EST MOD 30 MIN: CPT | Mod: PBBFAC,25 | Performed by: INTERNAL MEDICINE

## 2019-10-04 PROCEDURE — 94729 DIFFUSING CAPACITY: CPT | Mod: PBBFAC

## 2019-10-04 PROCEDURE — 94010 BREATHING CAPACITY TEST: ICD-10-PCS | Mod: 26,59,S$PBB, | Performed by: INTERNAL MEDICINE

## 2019-10-04 RX ORDER — DICLOFENAC SODIUM 50 MG/1
TABLET, DELAYED RELEASE ORAL
Refills: 5 | COMMUNITY
Start: 2019-08-31

## 2019-10-04 NOTE — PROCEDURES
[facility]  Six Minute Walk     SUMMARY     Ordering Provider: Dr. Hart   Interpreting Provider: Dr. Hart  Performing nurse/tech/RT: Ernesto RRT  Diagnosis: (Dyspnea)  Height: 5' (152.4 cm)  Weight: 57.5 kg (126 lb 12.2 oz)  BMI (Calculated): 24.8   Patient Race:             Phase Oxygen Assessment Supplemental O2 Heart   Rate Blood Pressure Lolis Dyspnea Scale Rating   Resting 99 % Room Air 77 bpm 119/72 2   Exercise        Minute        1 97 % Room Air 123 bpm     2 97 % Room Air 118 bpm     3 97 % Room Air 120 bpm     4 98 % Room Air 123 bpm     5 97 % Room Air 120 bpm     6  98 % Room Air 128 bpm 118/68 4   Recovery        Minute        1 98 % Room Air 112 bpm     2 98 % Room Air 88 bpm     3 97 % Room Air 97 bpm     4 97 % Room Air 80 bpm 103/73 3     Six Minute Walk Summary  6MWT Status: completed without stopping  Patient Reported: Chest pain, Leg pain     Interpretation:  Did the patient stop or pause?: No       Total Time Walked (Calculated): 360 seconds  Final Partial Lap Distance (feet): 0 feet  Total Distance Meters (Calculated): 426.72 meters   LLN was 417.33m  Predicted Distance Meters (Calculated): 556.33 meters  Percentage of Predicted (Calculated): 76.7  Peak VO2 (Calculated): 16.78  Mets: 4.79             CLINICAL INTERPRETATION:  Six minute walk distance is 426.72m (76.7 % predicted) with light dyspnea.  During exercise, there was no significant desaturation while breathing room air.  Blood pressure remained stable and Heart rate increased significantly with walking.  This may represent a tachycardic response to exercise.  The patient reported non-pulmonary symptoms during exercise.  LEG PAIN  The patient did complete the study, walking 360 seconds of the 360 second test..  No previous study performed.  Based upon age and body mass index, exercise capacity is normal.

## 2019-10-04 NOTE — PROGRESS NOTES
Subjective:       Patient ID: Sole Gomez is a 54 y.o. female.  Patient Active Problem List   Diagnosis    Asthma    Vocal cord dysfunction    Hypertension    History of left breast cancer    Cardiomyopathy    Single kidney    History of DVT in adulthood    Anemia    Abnormal diffusion capacity determined by pulmonary function test     Immunization History   Administered Date(s) Administered    DTP 03/26/1968, 04/30/1968, 05/28/1968, 04/25/1972, 02/06/1973    Hepatitis B, Adult 10/17/2006, 12/22/2006    Influenza 10/04/2019    Influenza - Quadrivalent - PF (6 months and older) 09/21/2017    Influenza - Trivalent - PF (ADULT) 12/31/2013, 12/27/2015    MMR 11/16/2004    Measles / Rubella 05/02/1972    OPV 03/26/1968, 04/30/1968, 05/28/1968, 05/30/1972, 07/18/1972, 02/06/1973    Pneumococcal Polysaccharide - 23 Valent 02/04/2016    Rubella 12/19/1996    Tdap 11/13/1996, 10/17/2006     Answers for HPI/ROS submitted by the patient on 10/4/2019   Asthma  In the past 4 weeks, how much of the time did your asthma keep you from getting as much done at work, school, or at home?: a little of the time  During the past 4 weeks, how often have you had shortness of breath?: 3 to 6 times a week  During the past 4 weeks, how often did your asthma symptoms (Wheezing, coughing, shortness of breath, chest tightness or pain) wake you up at night or earlier that usual in the morning?: not at all  During the past 4 weeks, how often have you used your rescue inhaler or nebulizer medication (such as albuterol)?: once a week or less  How would you rate your asthma control during the past 4 weeks?: well controlled   : 20    Chief Complaint:   Ms Sole Gomez is 53 years old  Referred by Dr Mike Pollard  Here to reveiwed tests:  Feels better , saw ENT and referred to Dr Hinson  Has recently had x 2 episodes POST PRANDIAL SYNCOPE  Felt: dizzy, nausea, passed out  Spirometry was normal, MVV was Normal, TLC  mildly reduced  Use of rescue inhaler frequently  Stable on Symbicort and Xopenex  6MWD: Normal exercise capacity       Has had 2 episodes of passing out after eating? Dumping symdrome      The following portions of the patient's history were reviewed and updated as appropriate:   She  has a past medical history of Asthma.  She does not have any pertinent problems on file.  She  has no past surgical history on file.  Her family history includes Cancer in her father; Scleroderma in her mother.  She  reports that she has never smoked. She has never used smokeless tobacco. She reports that she does not drink alcohol. Her drug history is not on file.  She has a current medication list which includes the following prescription(s): atorvastatin, budesonide-formoterol 160-4.5 mcg, diclofenac, duloxetine, gabapentin, hydrocodone-acetaminophen, levalbuterol, levalbuterol, and pantoprazole.  Current Outpatient Medications on File Prior to Visit   Medication Sig Dispense Refill    atorvastatin (LIPITOR) 20 MG tablet Take 20 mg by mouth.      budesonide-formoterol 160-4.5 mcg (SYMBICORT) 160-4.5 mcg/actuation HFAA Inhale 2 puffs into the lungs every 12 (twelve) hours. Controller      diclofenac (VOLTAREN) 50 MG EC tablet TAKE 1 TABLET BY MOUTH TWICE A DAY AS NEEDED FOR PAIN WITH FOOD  5    DULoxetine (CYMBALTA) 20 MG capsule Take 20 mg by mouth once daily.      gabapentin (NEURONTIN) 100 MG capsule       HYDROcodone-acetaminophen (NORCO)  mg per tablet Take 1 tablet by mouth every 6 (six) hours as needed.      levalbuterol (XOPENEX HFA) 45 mcg/actuation inhaler Inhale 1-2 puffs into the lungs every 4 (four) hours as needed for Wheezing. Rescue      levalbuterol (XOPENEX) 1.25 mg/3 mL nebulizer solution   0    pantoprazole (PROTONIX) 40 MG tablet Take 1 tablet (40 mg total) by mouth once daily. 30 tablet 11     No current facility-administered medications on file prior to visit.      She is allergic to albuterol  sulfate; ondansetron hcl (pf); and oxycodone-acetaminophen..    Review of Systems   Review of Systems   Constitutional: Positive for malaise/fatigue.   HENT: Positive for sore throat.         Voice change   Eyes: Negative.    Respiratory: Positive for shortness of breath.    Cardiovascular: Negative.    Gastrointestinal: Negative.    Genitourinary: Negative.    Musculoskeletal: Negative.    Neurological: Negative.    Endo/Heme/Allergies: Negative.    Psychiatric/Behavioral: The patient is nervous/anxious.    All other systems reviewed and are negative.       Objective:     Vitals:    10/04/19 1515   BP: 103/73   Pulse: 80   Resp: 16   SpO2: 97%   Weight: 57.5 kg (126 lb 12.2 oz)   Height: 5' (1.524 m)     Physical Exam   Constitutional: She is oriented to person, place, and time. She appears well-developed and well-nourished.   HENT:   Head: Normocephalic and atraumatic.   Nose: Nose normal.   malampati score 3   Eyes: Pupils are equal, round, and reactive to light. Conjunctivae and EOM are normal.   Neck: Normal range of motion. Neck supple.   Cardiovascular: Normal rate, regular rhythm and normal heart sounds.   No murmur heard.  Pulmonary/Chest: Effort normal and breath sounds normal. No stridor. No respiratory distress. She has no wheezes. She has no rales.   Abdominal: Soft. Bowel sounds are normal.   Musculoskeletal: Normal range of motion. She exhibits no edema.   Neurological: She is alert and oriented to person, place, and time. No cranial nerve deficit.   Skin: Skin is warm and dry. Capillary refill takes 2 to 3 seconds.   Nursing note and vitals reviewed.        Data Review: CBC:   Lab Results   Component Value Date    WBC 6.85 06/28/2019    RBC 4.67 06/28/2019    HGB 12.5 06/28/2019    HCT 40.3 06/28/2019     06/28/2019     BMP: No results found for: GLU, NA, K, CL, CO2, BUN, CREATININE, CALCIUM  Radiology review:    Diagnostics: Chest x-ray:  Two view chest x-ray performed today was  normal.  Pulmonary function tests: FEV1: 1.71L  (84.4 % predicted), FVC:  2.33L (93.4 % predicted), FEV1/FVC:  73.39     Normal spirometry  TLC 3.01L( 71%)  DLCO 3.51( 49.2%)  Corrected 107.2%  MVV was 91.5%    6MW Test  Height: 5' (152.4 cm)  Weight: 57.5 kg (126 lb 12.2 oz)  BMI (Calculated): 24.8  Predicted Distance: 420.09  Interpretation  Predicted Distance Meters (Calculated): 556.33 meters  Max  BPM  SpO naya 97%  Distance: 426.72m ( 76.7%),  Predicted   Assessment:      Problem List Items Addressed This Visit     Asthma - Primary    Relevant Orders    X-Ray Chest PA And Lateral    Spirometry without Bronchodilator    Vocal cord dysfunction    Hypertension    Abnormal diffusion capacity determined by pulmonary function test     Uncorrected DLCO was 3.51( 49.2%)  Corrected DLCO was 107.2%           Other Visit Diagnoses     Dumping syndrome        Relevant Orders    Ambulatory referral/consult to Endocrinology         Plan:     Orders Placed This Encounter   Procedures    X-Ray Chest PA And Lateral     Standing Status:   Future     Standing Expiration Date:   10/3/2020    Ambulatory referral/consult to Endocrinology     Standing Status:   Future     Standing Expiration Date:   11/4/2020     Referral Priority:   Routine     Referral Type:   Consultation     Requested Specialty:   Endocrinology     Number of Visits Requested:   1    Spirometry without Bronchodilator     Standing Status:   Future     Standing Expiration Date:   10/3/2020     Follow-up annually      Follow up in about 1 year (around 10/4/2020), or Flu shot today, Follow with Dr Simmons, ref Endocrine, for Spirometry and CXR next visit, nurse schedule flu shot schedule.    This note was prepared using voice recognition system and is likely to have sound alike errors that may have been overlooked even after proof reading.  Please call me with any questions    Discussed diagnosis, its evaluation, treatment and usual course. All questions  answered.    Thank you for the courtesy of participating in the care of this patient    Marcial Hart MD      Component      Latest Ref Rng & Units 6/28/2019   WBC      3.90 - 12.70 K/uL 6.85   RBC      4.00 - 5.40 M/uL 4.67   Hemoglobin      12.0 - 16.0 g/dL 12.5   Hematocrit      37.0 - 48.5 % 40.3   MCV      82 - 98 fL 86   MCH      27.0 - 31.0 pg 26.8 (L)   MCHC      32.0 - 36.0 g/dL 31.0 (L)   RDW      11.5 - 14.5 % 13.7   Platelets      150 - 350 K/uL 222   MPV      9.2 - 12.9 fL 10.3   Immature Granulocytes      0.0 - 0.5 % 0.4   Gran # (ANC)      1.8 - 7.7 K/uL 4.3   Immature Grans (Abs)      0.00 - 0.04 K/uL 0.03   Lymph #      1.0 - 4.8 K/uL 1.9   Mono #      0.3 - 1.0 K/uL 0.6   Eos #      0.0 - 0.5 K/uL 0.1   Baso #      0.00 - 0.20 K/uL 0.05   nRBC      0 /100 WBC 0   Gran%      38.0 - 73.0 % 62.7   Lymph%      18.0 - 48.0 % 27.3   Mono%      4.0 - 15.0 % 8.3   Eosinophil%      0.0 - 8.0 % 1.0   Basophil%      0.0 - 1.9 % 0.7   Differential Method       Automated   D-Dimer      <0.50 mg/L FEU 0.32       PFTs performed 02/18/2019  FEV1 1.82 (93% predicted) FVC 2.22 (91% predicted) FEV1 /FVC 82.  MVV was 80 3% predicted,   TLC was 2.55 (65% predicted) DLCO was 15.42 (77% predicted)  Interpretation  Moderate restriction, reduced diffusion capacity

## 2019-10-16 ENCOUNTER — OFFICE VISIT (OUTPATIENT)
Dept: ENDOCRINOLOGY | Facility: CLINIC | Age: 54
End: 2019-10-16
Payer: MEDICARE

## 2019-10-16 VITALS
HEIGHT: 60 IN | DIASTOLIC BLOOD PRESSURE: 76 MMHG | HEART RATE: 73 BPM | BODY MASS INDEX: 24.49 KG/M2 | SYSTOLIC BLOOD PRESSURE: 122 MMHG | TEMPERATURE: 98 F | WEIGHT: 124.75 LBS

## 2019-10-16 DIAGNOSIS — R55 SYNCOPE, UNSPECIFIED SYNCOPE TYPE: Primary | ICD-10-CM

## 2019-10-16 DIAGNOSIS — Z79.899 OTHER LONG TERM (CURRENT) DRUG THERAPY: ICD-10-CM

## 2019-10-16 DIAGNOSIS — R11.0 NAUSEA: ICD-10-CM

## 2019-10-16 DIAGNOSIS — R53.83 FATIGUE, UNSPECIFIED TYPE: ICD-10-CM

## 2019-10-16 DIAGNOSIS — R42 DIZZINESS: ICD-10-CM

## 2019-10-16 DIAGNOSIS — K91.1 DUMPING SYNDROME: ICD-10-CM

## 2019-10-16 PROCEDURE — 99204 PR OFFICE/OUTPT VISIT, NEW, LEVL IV, 45-59 MIN: ICD-10-PCS | Mod: S$PBB,,, | Performed by: INTERNAL MEDICINE

## 2019-10-16 PROCEDURE — 99213 OFFICE O/P EST LOW 20 MIN: CPT | Mod: PBBFAC | Performed by: INTERNAL MEDICINE

## 2019-10-16 PROCEDURE — 99999 PR PBB SHADOW E&M-EST. PATIENT-LVL III: CPT | Mod: PBBFAC,,, | Performed by: INTERNAL MEDICINE

## 2019-10-16 PROCEDURE — 99999 PR PBB SHADOW E&M-EST. PATIENT-LVL III: ICD-10-PCS | Mod: PBBFAC,,, | Performed by: INTERNAL MEDICINE

## 2019-10-16 PROCEDURE — 99204 OFFICE O/P NEW MOD 45 MIN: CPT | Mod: S$PBB,,, | Performed by: INTERNAL MEDICINE

## 2019-10-16 NOTE — LETTER
October 16, 2019      Marcial Hart MD  [address]  [place] [address]           [facility]  [address]  [place] [address]  Phone: [phone]  Fax: [phone]          Patient: Sole Gomez   MR Number: [id number]   YOB: 1965   Date of Visit: 10/16/2019       Dear Dr. Marcial Hart:    Thank you for referring Sole Gomez to me for evaluation. Attached you will find relevant portions of my assessment and plan of care.    If you have questions, please do not hesitate to call me. I look forward to following Sole Gomez along with you.    Sincerely,    Emma Lewis MD    Enclosure  CC:  No Recipients    If you would like to receive this communication electronically, please contact [email or web address] or [phone] to request more information on [employer] access.    For providers and/or their staff who would like to refer a patient to [facility], please contact us through our one-stop-shop provider referral line, [facility], at [phone].    If you feel you have received this communication in error or would no longer like to receive these types of communications, please e-mail [email or web address]

## 2019-10-16 NOTE — PROGRESS NOTES
Referring Provider:  Marcial Hart MD    PCP:  Mike Pollard MD    Reason for referral:   Dumping syndrome  CC:  Passing out    HPI:  Sole Gomez 54 y.o. female  Patient is accompanied by her .  She visited her pulmonologist recently and she was referred because of dumping syndrome.     Patient describes passing out after eating.  She said she passed out 3 times after eating.  First time she passed out after eating pancake and eggs she was with her .  Sec time she passed out after eating grilled chicken.  The 3rd time she passed out after eating beans.  Passing out occurred within couple seconds after finishing eating.  Patient felt bad, and dizzy, weak, with no feeling of tremors or blurred vision or chest pain prior to passing out.  She did not lose bladder control after passing out.  She believes that she passed out for 1 min, then she wakes up  And she goes to bathroom and she may go to work.  No seizure occurred.    Patient has significant medical history including breast cancer treated by surgery, chemo, and radiation 2012.  Other medical history include asthma.  Last use of an inhaler for asthma was couple months ago.  History of heart murmur.  Patient was evaluated by cardiologist because of heart problem after chemotherapy.  Patient was evaluated by a neurologist for a nerve damage after chemo per patient.  No history of treatment with steroid in the last couple years except for using steroid inhaler sometimes.  No complaints of chest pain, shortness of breath, diabetes, vomiting, or rash.  There is usually no nausea or vomiting after eating.  Sweating occurred only after 3rd time patient passed out.  There is no family history of seizure and no family history of adrenal gland problem.    Past Medical History:   Diagnosis Date    Asthma        History reviewed. No pertinent surgical history.    Social History     Socioeconomic History    Marital status:       Spouse name: Not on file    Number of children: Not on file    Years of education: Not on file    Highest education level: Not on file   Occupational History    Not on file   Social Needs    Financial resource strain: Not on file    Food insecurity:     Worry: Not on file     Inability: Not on file    Transportation needs:     Medical: Not on file     Non-medical: Not on file   Tobacco Use    Smoking status: Never Smoker    Smokeless tobacco: Never Used   Substance and Sexual Activity    Alcohol use: Never     Frequency: Never    Drug use: Not on file    Sexual activity: Not on file   Lifestyle    Physical activity:     Days per week: Not on file     Minutes per session: Not on file    Stress: Not on file   Relationships    Social connections:     Talks on phone: Not on file     Gets together: Not on file     Attends Taoism service: Not on file     Active member of club or organization: Not on file     Attends meetings of clubs or organizations: Not on file     Relationship status: Not on file   Other Topics Concern    Not on file   Social History Narrative    Not on file         ROS:   Included in HPI  ROS otherwise neg except for what is mentioned in the PMH, PSH and HPI    PE:  Vitals:    10/16/19 1025   BP: 122/76   Pulse: 73   Temp: 98.3 °F (36.8 °C)     Alert and oriented  No acute distress  No acne  No Proptosis or conjunctivitis  No rash on tongue, + teeth  No goitre by inspection  Thyroid gland is not palpable  No cervical lymphadenopathy  Heart reg, no gallop  Lungs cta, no wheezing  Abd soft, no tnd  No edema in lower legs  No rash  No bruises  Speech normal  Behavior normal  No tremor  No obesity  Body mass index is 24.37 kg/m².      Lab:  To be obtained  From dr. Pollard    A/P:  Episodes of passing out right after eating and after having dizziness, nausea and weakness.  Syncope, unspecified syncope type  The etiology of syncope is not clear  For now adrenal insufficiency to be ruled  out  Hypoglycemia as an etiology cannot be ruled out, although having no tremors or blurred vision  or having sweating all was with syncope makes hypoglycemia unlikely  Patient is advised to follow-up with her cardiologist and with her neurologist  -     Cortisol; Future; Expected date: 10/17/2019  -     ACTH; Future; Expected date: 10/17/2019  -     Cortisol; Future; Expected date: 10/17/2019  -     Cortisol, 8AM; Future; Expected date: 10/17/2019  -     Hemoglobin A1c; Future; Expected date: 10/16/2019    Dizziness  -     Cortisol; Future; Expected date: 10/17/2019  -     ACTH; Future; Expected date: 10/17/2019  -     Cortisol; Future; Expected date: 10/17/2019  -     Cortisol, 8AM; Future; Expected date: 10/17/2019  -     Hemoglobin A1c; Future; Expected date: 10/16/2019    Fatigue, unspecified type  -     Cortisol; Future; Expected date: 10/17/2019  -     ACTH; Future; Expected date: 10/17/2019  -     Cortisol; Future; Expected date: 10/17/2019  -     Cortisol, 8AM; Future; Expected date: 10/17/2019  -     Hemoglobin A1c; Future; Expected date: 10/16/2019    Other long term (current) drug therapy   -     Hemoglobin A1c; Future; Expected date: 10/16/2019     History of breast cancer treated by surgery, chemotherapy, and radiation      Appt in 4 weeks.      Pt understands the plan and instructions.

## 2019-10-22 ENCOUNTER — LAB VISIT (OUTPATIENT)
Dept: LAB | Facility: HOSPITAL | Age: 54
End: 2019-10-22
Attending: INTERNAL MEDICINE
Payer: MEDICARE

## 2019-10-22 ENCOUNTER — CLINICAL SUPPORT (OUTPATIENT)
Dept: ENDOCRINOLOGY | Facility: CLINIC | Age: 54
End: 2019-10-22
Payer: MEDICARE

## 2019-10-22 DIAGNOSIS — R55 SYNCOPE, UNSPECIFIED SYNCOPE TYPE: ICD-10-CM

## 2019-10-22 DIAGNOSIS — R53.83 FATIGUE, UNSPECIFIED TYPE: Primary | ICD-10-CM

## 2019-10-22 DIAGNOSIS — Z79.899 OTHER LONG TERM (CURRENT) DRUG THERAPY: ICD-10-CM

## 2019-10-22 DIAGNOSIS — R53.83 FATIGUE, UNSPECIFIED TYPE: ICD-10-CM

## 2019-10-22 DIAGNOSIS — R11.0 NAUSEA: ICD-10-CM

## 2019-10-22 DIAGNOSIS — R42 DIZZINESS: ICD-10-CM

## 2019-10-22 LAB
CORTIS SERPL-MCNC: 15.5 UG/DL
CORTIS SERPL-MCNC: 18.1 UG/DL
CORTIS SERPL-MCNC: 8.2 UG/DL (ref 4.3–22.4)
ESTIMATED AVG GLUCOSE: 114 MG/DL (ref 68–131)
HBA1C MFR BLD HPLC: 5.6 % (ref 4–5.6)

## 2019-10-22 PROCEDURE — 99212 OFFICE O/P EST SF 10 MIN: CPT | Mod: PBBFAC

## 2019-10-22 PROCEDURE — 82024 ASSAY OF ACTH: CPT

## 2019-10-22 PROCEDURE — 82533 TOTAL CORTISOL: CPT | Mod: 91

## 2019-10-22 PROCEDURE — 83036 HEMOGLOBIN GLYCOSYLATED A1C: CPT

## 2019-10-22 PROCEDURE — 82533 TOTAL CORTISOL: CPT

## 2019-10-22 PROCEDURE — 36415 COLL VENOUS BLD VENIPUNCTURE: CPT

## 2019-10-22 PROCEDURE — 99999 PR PBB SHADOW E&M-EST. PATIENT-LVL II: CPT | Mod: PBBFAC,,,

## 2019-10-22 PROCEDURE — 99999 PR PBB SHADOW E&M-EST. PATIENT-LVL II: ICD-10-PCS | Mod: PBBFAC,,,

## 2019-10-22 RX ORDER — COSYNTROPIN 0.25 MG/ML
0.25 INJECTION, POWDER, FOR SOLUTION INTRAMUSCULAR; INTRAVENOUS
Status: COMPLETED | OUTPATIENT
Start: 2019-10-22 | End: 2019-10-22

## 2019-10-22 RX ADMIN — COSYNTROPIN 0.25 MG: 0.25 INJECTION, POWDER, LYOPHILIZED, FOR SOLUTION INTRAVENOUS at 08:10

## 2019-10-24 NOTE — PROGRESS NOTES
Patient came for ATCH.Lab was drawn then patient came here for injection. Injection administered with no complaints patient waited 15 mins to ensure no reaction then headed down to labs for 2 separate lab draws

## 2019-10-25 LAB — ACTH PLAS-MCNC: 8 PG/ML (ref 0–46)

## 2019-11-06 ENCOUNTER — PATIENT MESSAGE (OUTPATIENT)
Dept: ENDOCRINOLOGY | Facility: CLINIC | Age: 54
End: 2019-11-06

## 2019-11-06 DIAGNOSIS — R55 SYNCOPE, UNSPECIFIED SYNCOPE TYPE: Primary | ICD-10-CM

## 2019-11-12 ENCOUNTER — CLINICAL SUPPORT (OUTPATIENT)
Dept: ENDOCRINOLOGY | Facility: CLINIC | Age: 54
End: 2019-11-12
Payer: MEDICARE

## 2019-11-12 ENCOUNTER — LAB VISIT (OUTPATIENT)
Dept: LAB | Facility: HOSPITAL | Age: 54
End: 2019-11-12
Attending: INTERNAL MEDICINE
Payer: MEDICARE

## 2019-11-12 DIAGNOSIS — R55 SYNCOPE, UNSPECIFIED SYNCOPE TYPE: ICD-10-CM

## 2019-11-12 LAB
C PEPTIDE SERPL-MCNC: 1.33 NG/ML (ref 0.78–5.19)
CORTIS SERPL-MCNC: 7.2 UG/DL (ref 4.3–22.4)
GLUCOSE SERPL-MCNC: 83 MG/DL (ref 70–110)

## 2019-11-12 PROCEDURE — 36415 COLL VENOUS BLD VENIPUNCTURE: CPT

## 2019-11-12 PROCEDURE — 99999 PR PBB SHADOW E&M-EST. PATIENT-LVL II: CPT | Mod: PBBFAC,,,

## 2019-11-12 PROCEDURE — 82533 TOTAL CORTISOL: CPT | Mod: 91

## 2019-11-12 PROCEDURE — 84681 ASSAY OF C-PEPTIDE: CPT

## 2019-11-12 PROCEDURE — 99999 PR PBB SHADOW E&M-EST. PATIENT-LVL II: ICD-10-PCS | Mod: PBBFAC,,,

## 2019-11-12 PROCEDURE — 99212 OFFICE O/P EST SF 10 MIN: CPT | Mod: PBBFAC

## 2019-11-12 PROCEDURE — 82947 ASSAY GLUCOSE BLOOD QUANT: CPT

## 2019-11-12 RX ORDER — COSYNTROPIN 0.25 MG/ML
0.25 INJECTION, POWDER, FOR SOLUTION INTRAMUSCULAR; INTRAVENOUS
Status: COMPLETED | OUTPATIENT
Start: 2019-11-12 | End: 2019-11-12

## 2019-11-12 RX ADMIN — COSYNTROPIN 0.25 MG: 0.25 INJECTION, POWDER, LYOPHILIZED, FOR SOLUTION INTRAVENOUS at 08:11

## 2019-11-13 ENCOUNTER — OFFICE VISIT (OUTPATIENT)
Dept: ENDOCRINOLOGY | Facility: CLINIC | Age: 54
End: 2019-11-13
Payer: MEDICARE

## 2019-11-13 ENCOUNTER — LAB VISIT (OUTPATIENT)
Dept: LAB | Facility: HOSPITAL | Age: 54
End: 2019-11-13
Attending: INTERNAL MEDICINE
Payer: MEDICARE

## 2019-11-13 VITALS
HEIGHT: 60 IN | TEMPERATURE: 98 F | HEART RATE: 70 BPM | SYSTOLIC BLOOD PRESSURE: 124 MMHG | DIASTOLIC BLOOD PRESSURE: 88 MMHG | WEIGHT: 128.06 LBS | BODY MASS INDEX: 25.14 KG/M2

## 2019-11-13 DIAGNOSIS — R55 SYNCOPE, UNSPECIFIED SYNCOPE TYPE: ICD-10-CM

## 2019-11-13 DIAGNOSIS — R53.83 FATIGUE, UNSPECIFIED TYPE: ICD-10-CM

## 2019-11-13 DIAGNOSIS — R79.89 ABNORMAL CORTISOL LEVEL: ICD-10-CM

## 2019-11-13 DIAGNOSIS — R55 SYNCOPE, UNSPECIFIED SYNCOPE TYPE: Primary | ICD-10-CM

## 2019-11-13 PROCEDURE — 99214 OFFICE O/P EST MOD 30 MIN: CPT | Mod: PBBFAC | Performed by: INTERNAL MEDICINE

## 2019-11-13 PROCEDURE — 99214 PR OFFICE/OUTPT VISIT, EST, LEVL IV, 30-39 MIN: ICD-10-PCS | Mod: S$PBB,,, | Performed by: INTERNAL MEDICINE

## 2019-11-13 PROCEDURE — 99999 PR PBB SHADOW E&M-EST. PATIENT-LVL IV: ICD-10-PCS | Mod: PBBFAC,,, | Performed by: INTERNAL MEDICINE

## 2019-11-13 PROCEDURE — 99999 PR PBB SHADOW E&M-EST. PATIENT-LVL IV: CPT | Mod: PBBFAC,,, | Performed by: INTERNAL MEDICINE

## 2019-11-13 PROCEDURE — 99214 OFFICE O/P EST MOD 30 MIN: CPT | Mod: S$PBB,,, | Performed by: INTERNAL MEDICINE

## 2019-11-13 NOTE — PROGRESS NOTES
"Referring Provider:  Marcial Hart MD    PCP:  Mike Pollard MD    Reason for referral:   Dumping syndrome  CC:  Passing out    HPI:  Sole Gomez 54 y.o. female  Patient is accompanied by her .  She visited her pulmonologist recently and she was referred because of "dumping syndrome".     Patient describes passing out after eating.  She said she passed out 3 times after eating. Last syncope was at work , prior to last visit here,  And 1st time of syncope was 3 y ago.  First time she passed out after eating pancake and eggs she was with her .  Sec time she passed out after eating grilled chicken.  The 3rd time she passed out after eating beans.  Passing out occurred within couple seconds after finishing eating.  Patient felt bad, and dizzy, weak, with no feeling of tremors or blurred vision or chest pain prior to passing out.  She did not lose bladder control after passing out.  She believes that she passed out for 1 min, then she wakes up  And she goes to bathroom and she may go to work.  No seizure occurred.    APPT WITH CARDIO WAS SCHEDULED FOR 11/22 PER PT.  Patient has significant medical history including breast cancer treated by surgery, chemo, and radiation 2012.  Other medical history include asthma.  Last use of an inhaler for asthma was ? At least 1 m ago.  History of heart murmur.  Patient was evaluated by cardiologist because of heart problem after chemotherapy.  Patient was evaluated by a neurologist for a nerve damage after chemo per patient.  No history of treatment with steroid in the last couple years except for using steroid inhaler sometimes.  No complaints of chest pain, shortness of breath, diabetes, vomiting, or rash.  There is usually no nausea or vomiting after eating.  Sweating occurred only after 3rd time patient passed out.  There is no family history of seizure and no family history of adrenal gland problem.    Driving to [place] daily from " [place].  Working in [employer].    Past Medical History:   Diagnosis Date    Asthma        History reviewed. No pertinent surgical history.    Social History     Socioeconomic History    Marital status:      Spouse name: Not on file    Number of children: Not on file    Years of education: Not on file    Highest education level: Not on file   Occupational History    Not on file   Social Needs    Financial resource strain: Not on file    Food insecurity:     Worry: Not on file     Inability: Not on file    Transportation needs:     Medical: Not on file     Non-medical: Not on file   Tobacco Use    Smoking status: Never Smoker    Smokeless tobacco: Never Used   Substance and Sexual Activity    Alcohol use: Never     Frequency: Never    Drug use: Not on file    Sexual activity: Not on file   Lifestyle    Physical activity:     Days per week: Not on file     Minutes per session: Not on file    Stress: Not on file   Relationships    Social connections:     Talks on phone: Not on file     Gets together: Not on file     Attends Restorationist service: Not on file     Active member of club or organization: Not on file     Attends meetings of clubs or organizations: Not on file     Relationship status: Not on file   Other Topics Concern    Not on file   Social History Narrative    Not on file         ROS:   Breast cancer 2012  One Kidney removed in 2011 ( Tumor was not cancer)  Fatigue  No menstrual period since receiving chemo and radiation in [id number]  ROS otherwise neg except for what is mentioned in the PMH, PSH and HPI    PE:  Vitals:    11/13/19 1502   BP: 128/80   Pulse: 75   Temp: 98.3 °F (36.8 °C)      Alert and oriented  No acute distress  No Proptosis or conjunctivitis   + teeth  No goitre by inspection  Thyroid gland is not palpable  Heart reg, no gallop  Lungs cta, no wheezing  No edema in lower legs  Speech normal  Behavior normal  No tremor  No obesity  Body mass index is 25.02  kg/m².    BLOOD PRESSURE WHEN PATIENT IS LYING DOWN AND WHEN SHE IS SITTING UP  CHECKED LATER IN VISIT BY MY NURSE  NO ORTHOSTATIC CHANGES  Lab:     Ref. Range 8/21/2017 14:05 10/22/2019 08:24 10/22/2019 09:21 10/22/2019 09:50 11/12/2019 07:59 11/12/2019 08:50 11/12/2019 09:20   Cortisol Latest Units: ug/dL   15.50 18.10  16.60 18.50   Cortisol -8 AM Latest Ref Range: 4.30 - 22.40 ug/dL  8.20   7.20         A/P:  Episodes of passing out right after eating and after having dizziness, nausea and weakness.  Syncope, unspecified syncope type  The etiology of syncope is not clear   ACTH stim test results showed cortisol level of 18.1 in the 1st test  And cortisol got to 18.5 when ACTH stim test was repeated  It is not clear if anything is interfering with the borderline cortisol level, and  Adrenal insufficiency can not be ruled out at this time.  I have explained to the patient  The need for treatment with steroid if she will have any surgery done.  We have discussed  The need to repeat the ACTH stim test in January.  Steroid will be considered if cortisol level worsen  By another ACTH stim test which is planned for January.  Hypoglycemia as an etiology cannot be ruled out, although having no tremors or blurred vision  or having sweating all was with syncope makes hypoglycemia unlikely    Patient is advised to follow-up with her cardiologist and with her neurologist  Dizziness  Fatigue, unspecified type   History of breast cancer treated by surgery, chemotherapy, and radiation  One Kidney    Appt in 4 weeks.      Pt understands the plan and instructions.

## 2019-11-13 NOTE — LETTER
November 13, 2019      Marcial Hart MD  [address]  [place] [address]           [facility]  [address]  [place] [address]  Phone: [phone]  Fax: [phone]          Patient: Sole Gomez   MR Number: [id number]   YOB: 1965   Date of Visit: 11/13/2019       Dear Dr. Marcial Hart:    Thank you for referring Sole Gomez to me for evaluation. Attached you will find relevant portions of my assessment and plan of care.    If you have questions, please do not hesitate to call me. I look forward to following Sole Gomez along with you.    Sincerely,    Emma Lewis MD    Enclosure  CC:  No Recipients    If you would like to receive this communication electronically, please contact [email or web address] or [phone] to request more information on [employer] Link access.    For providers and/or their staff who would like to refer a patient to [facility], please contact us through our one-stop-shop provider referral line, [facility], at [phone].    If you feel you have received this communication in error or would no longer like to receive these types of communications, please e-mail [email or web address]

## 2019-11-13 NOTE — PATIENT INSTRUCTIONS
You will need to be on Steroid treatment before any planned surgery,  and on IV steroid if you will need emergency surgery for any reason.    Blood test for Cortisol stimulation will need to be repeated  In about 8 weeks to see if your Cortisol will improve.  No clear cause yet for passing out. Keep your appt with your cardiologist.

## 2019-11-14 NOTE — PROGRESS NOTES
Pt in for ATCH stimulation test. Medication prepared per protocol,return verbalization from pt to remain in lobby for 15 minutes then to lab to complete lab draws

## 2020-01-14 ENCOUNTER — PATIENT MESSAGE (OUTPATIENT)
Dept: ENDOCRINOLOGY | Facility: CLINIC | Age: 55
End: 2020-01-14

## 2020-01-14 ENCOUNTER — TELEPHONE (OUTPATIENT)
Dept: ENDOCRINOLOGY | Facility: CLINIC | Age: 55
End: 2020-01-14

## 2020-01-14 DIAGNOSIS — R79.89 LOW SERUM CORTISOL LEVEL: Primary | ICD-10-CM

## 2020-01-14 NOTE — TELEPHONE ENCOUNTER
Called patient to set up ATCH Stim Test, patient was unable to give a date at this time to call back.

## 2020-01-14 NOTE — TELEPHONE ENCOUNTER
Let me know which day patient will be scheduled for so I can order Cosyntropin injection for that day.

## 2020-03-24 ENCOUNTER — OFFICE VISIT (OUTPATIENT)
Dept: ENDOCRINOLOGY | Facility: CLINIC | Age: 55
End: 2020-03-24
Payer: MEDICARE

## 2020-03-24 DIAGNOSIS — R79.89 LOW SERUM CORTISOL LEVEL: Primary | ICD-10-CM

## 2020-03-24 PROCEDURE — 99213 PR OFFICE/OUTPT VISIT, EST, LEVL III, 20-29 MIN: ICD-10-PCS | Mod: 95,,, | Performed by: INTERNAL MEDICINE

## 2020-03-24 PROCEDURE — 99213 OFFICE O/P EST LOW 20 MIN: CPT | Mod: 95,,, | Performed by: INTERNAL MEDICINE

## 2020-03-24 NOTE — LETTER
March 24, 2020      Marcial Hart MD  [address]  [place] [address]           [facility]  [address]  [place] [address]  Phone: [phone]  Fax: [phone]          Patient: Sole Gomez   MR Number: [id number]   YOB: 1965   Date of Visit: 3/24/2020       Dear Dr. Marcial Hart:    Thank you for referring Sole Gomez to me for evaluation. Attached you will find relevant portions of my assessment and plan of care.    If you have questions, please do not hesitate to call me. I look forward to following Sole Gomez along with you.    Sincerely,    Emma Lewis MD    Enclosure  CC:  No Recipients    If you would like to receive this communication electronically, please contact [email or web address] or [phone] to request more information on [employer] Link access.    For providers and/or their staff who would like to refer a patient to [facility], please contact us through our one-stop-shop provider referral line, [facility], at [phone].    If you feel you have received this communication in error or would no longer like to receive these types of communications, please e-mail [email or web address]

## 2020-03-24 NOTE — PROGRESS NOTES
The patient location is:  Patient Home   The chief complaint leading to consultation is:  Follow-up on cortisol and history of passing out  Visit type: Virtual visit with synchronous audio and video  Total time spent with patient: 10 min    HPI:  Patient is feeling fine and she is not sick.  She was evaluated by me in November   Because of passing out right after eating.  She has no more syncope since October.  She was evaluated by her cardiologist and her neurologist.  She said a tilting test was done,  and she has been having cardiac monitor on.  She has no complaints of dysphagia, chest pain, shortness of breath,  Nausea, vomiting, or edema.    Review of system  Included in HPI    Alert and oriented  No acute distress    Assessment plan  Status post syncope post meal more than once  ACTH stimulation test showed cortisol up to 18.5 (low serum cortisol by stim test)  Patient is advised to have the ACTH stim test repeated in about 4 weeks  And the test to be delayed further if there is any use of steroid inhaler within 3 weeks before the test.    Orders Placed This Encounter   Procedures    Cortisol     60 Min after Cosyntropin inj administration     Standing Status:   Future     Standing Expiration Date:   6/24/2020    Cortisol, 8AM     Baseline, prior to Cosyntropin inj.     Standing Status:   Future     Standing Expiration Date:   6/24/2020    Cortisol     30 MIN AFTER COSYNTROPIN INJ ADMINISTRATION     Standing Status:   Future     Standing Expiration Date:   6/24/2020    ACTH     Baseline, prior to ACTH inj.     Standing Status:   Future     Standing Expiration Date:   6/24/2020

## 2020-03-31 ENCOUNTER — PATIENT MESSAGE (OUTPATIENT)
Dept: PULMONOLOGY | Facility: CLINIC | Age: 55
End: 2020-03-31

## 2020-03-31 DIAGNOSIS — J45.30 MILD PERSISTENT ASTHMA WITHOUT COMPLICATION: Primary | ICD-10-CM

## 2020-03-31 RX ORDER — LEVALBUTEROL INHALATION SOLUTION 1.25 MG/3ML
1 SOLUTION RESPIRATORY (INHALATION) EVERY 4 HOURS PRN
Qty: 150 ML | Refills: 11 | Status: SHIPPED | OUTPATIENT
Start: 2020-03-31

## 2020-03-31 RX ORDER — LEVALBUTEROL TARTRATE 45 UG/1
1-2 AEROSOL, METERED ORAL EVERY 4 HOURS PRN
Qty: 15 G | Refills: 11 | Status: SHIPPED | OUTPATIENT
Start: 2020-03-31

## 2020-05-07 ENCOUNTER — PATIENT MESSAGE (OUTPATIENT)
Dept: ENDOCRINOLOGY | Facility: CLINIC | Age: 55
End: 2020-05-07

## 2021-01-20 ENCOUNTER — CLINICAL SUPPORT (OUTPATIENT)
Dept: OTHER | Facility: CLINIC | Age: 56
End: 2021-01-20

## 2021-01-20 DIAGNOSIS — Z00.8 ENCOUNTER FOR OTHER GENERAL EXAMINATION: ICD-10-CM

## 2021-01-21 VITALS — BODY MASS INDEX: 25.02 KG/M2 | HEIGHT: 60 IN

## 2021-01-21 LAB
GLUCOSE SERPL-MCNC: 87 MG/DL (ref 60–140)
HDLC SERPL-MCNC: 73 MG/DL
POC CHOLESTEROL, LDL (DOCK): 105 MG/DL
POC CHOLESTEROL, TOTAL: 187 MG/DL
TRIGL SERPL-MCNC: 45 MG/DL

## 2021-05-04 ENCOUNTER — PATIENT MESSAGE (OUTPATIENT)
Dept: RESEARCH | Facility: HOSPITAL | Age: 56
End: 2021-05-04

## 2021-05-10 ENCOUNTER — PATIENT MESSAGE (OUTPATIENT)
Dept: RESEARCH | Facility: HOSPITAL | Age: 56
End: 2021-05-10

## 2022-03-27 ENCOUNTER — HOSPITAL ENCOUNTER (OUTPATIENT)
Dept: RADIOLOGY | Facility: CLINIC | Age: 57
Discharge: HOME | End: 2022-03-27
Attending: PHYSICIAN ASSISTANT
Payer: MEDICARE

## 2022-03-27 ENCOUNTER — OFFICE VISIT (OUTPATIENT)
Dept: URGENT CARE | Facility: CLINIC | Age: 57
End: 2022-03-27
Payer: MEDICARE

## 2022-03-27 ENCOUNTER — HOSPITAL ENCOUNTER (OUTPATIENT)
Dept: RADIOLOGY | Facility: CLINIC | Age: 57
Discharge: HOME | End: 2022-03-27
Attending: PHYSICIAN ASSISTANT

## 2022-03-27 VITALS
OXYGEN SATURATION: 98 % | HEART RATE: 75 BPM | RESPIRATION RATE: 19 BRPM | TEMPERATURE: 98 F | HEIGHT: 60 IN | WEIGHT: 128 LBS | SYSTOLIC BLOOD PRESSURE: 108 MMHG | BODY MASS INDEX: 25.13 KG/M2 | DIASTOLIC BLOOD PRESSURE: 67 MMHG

## 2022-03-27 DIAGNOSIS — M70.61 TROCHANTERIC BURSITIS OF RIGHT HIP: Primary | ICD-10-CM

## 2022-03-27 DIAGNOSIS — M25.551 RIGHT HIP PAIN: ICD-10-CM

## 2022-03-27 PROCEDURE — 99213 OFFICE O/P EST LOW 20 MIN: CPT | Mod: S$GLB,,, | Performed by: PHYSICIAN ASSISTANT

## 2022-03-27 PROCEDURE — 73502 XR HIP WITH PELVIS WHEN PERFORMED, 2 OR 3  VIEWS RIGHT: ICD-10-PCS | Mod: RT,S$GLB,, | Performed by: RADIOLOGY

## 2022-03-27 PROCEDURE — 73502 X-RAY EXAM HIP UNI 2-3 VIEWS: CPT | Mod: RT,S$GLB,, | Performed by: RADIOLOGY

## 2022-03-27 PROCEDURE — 99213 PR OFFICE/OUTPT VISIT, EST, LEVL III, 20-29 MIN: ICD-10-PCS | Mod: S$GLB,,, | Performed by: PHYSICIAN ASSISTANT

## 2022-03-27 RX ORDER — PREDNISONE 20 MG/1
TABLET ORAL
Qty: 7 TABLET | Refills: 0 | Status: SHIPPED | OUTPATIENT
Start: 2022-03-27 | End: 2022-04-01

## 2022-03-27 RX ORDER — TRAMADOL HYDROCHLORIDE 50 MG/1
50 TABLET ORAL EVERY 6 HOURS PRN
Qty: 8 TABLET | Refills: 0 | Status: SHIPPED | OUTPATIENT
Start: 2022-03-27 | End: 2022-03-29

## 2022-03-27 RX ORDER — LEVALBUTEROL INHALATION SOLUTION 1.25 MG/3ML
SOLUTION RESPIRATORY (INHALATION)
COMMUNITY

## 2022-03-27 NOTE — PATIENT INSTRUCTIONS
Ice often for the first 24-48 hours, then may alternate ice and heat. TYLENOL (acetaminophen) 1000mg every 4-6 hours (max 4000mg/day) for pain. Ultram for breakthrough pain as needed up to every 6 hours (may cause drowsiness). Steroid (prednisone) for inflammation - may cause anxiety, insomnia, fluid retention, elevated blood pressure. Please discontinue the steroid if severe side effects.    Follow up with your doctor for any persistent or worsening pain.

## 2022-03-27 NOTE — PROGRESS NOTES
Subjective:       Patient ID: Sole Gomez is a 56 y.o. female.    Vitals:  height is 5' (1.524 m) and weight is 58.1 kg (128 lb). Her tympanic temperature is 97.5 °F (36.4 °C). Her blood pressure is 108/67 and her pulse is 75. Her respiration is 19 and oxygen saturation is 98%.     Chief Complaint: No chief complaint on file.    Pt. Presents today w/ severe right hip pain. Pt. States that right hip was in excruciating pain wee hours of the morning which caused her to be restless. Pt. States that she did not have any injury to her hip but has been twisting a lot recently.  Pt. States she is able to bear weight but it is very painful.  Pt. States that she exercise but is not aware of that causing pain. No back pain, leg weakness, numbness, tingling, or hx of problems with her hips. Tried tylenol without relief     Hip Pain   The incident occurred 6 to 12 hours ago. The incident occurred at home. The injury mechanism is unknown. The pain is present in the right hip. The pain is at a severity of 10/10. The pain is severe. The pain has been constant since onset. Pertinent negatives include no inability to bear weight, loss of motion, loss of sensation, muscle weakness, numbness or tingling. She reports no foreign bodies present. The symptoms are aggravated by weight bearing and movement. She has tried acetaminophen (tylenol) for the symptoms. The treatment provided no relief.       Constitution: Negative for chills, fatigue and fever.   HENT: Negative for congestion and sore throat.    Neck: Negative for neck stiffness and painful lymph nodes.   Cardiovascular: Negative for chest pain and leg swelling.   Eyes: Negative for double vision and blurred vision.   Respiratory: Negative for cough and shortness of breath.    Gastrointestinal: Negative for nausea, vomiting and diarrhea.   Genitourinary: Negative for dysuria, frequency, urgency and history of kidney stones.   Musculoskeletal: Positive for pain and  joint pain. Negative for joint swelling, muscle cramps and muscle ache.   Skin: Negative for color change, pale, rash and bruising.   Allergic/Immunologic: Negative for seasonal allergies.   Neurological: Negative for dizziness, history of vertigo, light-headedness, passing out, headaches, numbness and tingling.   Hematologic/Lymphatic: Negative for swollen lymph nodes.   Psychiatric/Behavioral: Negative for nervous/anxious, sleep disturbance and depression. The patient is not nervous/anxious.        Objective:      Physical Exam   Constitutional: She is oriented to person, place, and time. She appears well-developed. She is cooperative.  Non-toxic appearance. She does not appear ill. No distress.   HENT:   Head: Normocephalic and atraumatic.   Ears:   Right Ear: Hearing, external ear and ear canal normal.   Left Ear: Hearing, external ear and ear canal normal.   Nose: Nose normal. No mucosal edema, rhinorrhea or nasal deformity. No epistaxis. Right sinus exhibits no maxillary sinus tenderness and no frontal sinus tenderness. Left sinus exhibits no maxillary sinus tenderness and no frontal sinus tenderness.   Mouth/Throat: Uvula is midline, oropharynx is clear and moist and mucous membranes are normal. No trismus in the jaw. Normal dentition. No uvula swelling. No posterior oropharyngeal erythema.   Eyes: Conjunctivae and lids are normal. Right eye exhibits no discharge. Left eye exhibits no discharge. No scleral icterus.   Neck: Trachea normal and phonation normal. Neck supple.   Cardiovascular: Normal rate, regular rhythm, normal heart sounds and normal pulses.   Pulses:       Dorsalis pedis pulses are 2+ on the right side.   Pulmonary/Chest: Effort normal and breath sounds normal. No respiratory distress.   Abdominal: Normal appearance and bowel sounds are normal. She exhibits no distension and no mass. Soft. There is no abdominal tenderness.   Musculoskeletal: Normal range of motion.         General: No  deformity. Normal range of motion.      Comments: TTP over right greater trochanter. FROM right hip passive and active. No deformities. 5/5 strength 2+ reflexes grupo LE. Neg straight leg raise.     No lumbar TTP, step offs or deformities    Neurological: She is alert and oriented to person, place, and time. She exhibits normal muscle tone. Coordination normal.   Skin: Skin is warm, dry, intact, not diaphoretic and not pale.   Psychiatric: Her speech is normal and behavior is normal. Judgment and thought content normal.   Nursing note and vitals reviewed.      X-Ray Hip 2 or 3 views Right (with Pelvis when performed)    Result Date: 3/27/2022  EXAMINATION: XR HIP WITH PELVIS WHEN PERFORMED, 2 OR 3  VIEWS RIGHT CLINICAL HISTORY: Pain in right hip TECHNIQUE: AP view of the pelvis and frog leg lateral view of the right hip were performed. COMPARISON: None FINDINGS: No displaced fracture or subluxation.  No suspicious bone lesion. Minimal right hip DJD. Unremarkable anterior pubic symphysis. Unremarkable bilateral SI joints. Unremarkable soft tissues.     No acute abnormality. Electronically signed by: Adriano Flores MD Date:    03/27/2022 Time:    11:18    Assessment:       1. Trochanteric bursitis of right hip          Plan:       Hx of cardiomyopathy and single kidney. Unsure of last EF as pt is not existing [facility] pt. Will avoid NSAIDS. rec tylenol prn pain. Short course prednisone. Discussed risks and side effects. D/c if severe side effects. Prn ultram - did review [place] rx site - no recent narcotics rx.    Trochanteric bursitis of right hip  -     X-Ray Hip 2 or 3 views Right (with Pelvis when performed); Future; Expected date: 03/27/2022    Other orders  -     predniSONE (DELTASONE) 20 MG tablet; Take 1 tablet (20 mg total) by mouth 2 (two) times daily for 2 days, THEN 1 tablet (20 mg total) once daily for 3 days.  Dispense: 7 tablet; Refill: 0  -     traMADoL (ULTRAM) 50 mg tablet; Take 1 tablet (50 mg total) by  mouth every 6 (six) hours as needed for Pain.  Dispense: 8 tablet; Refill: 0    Caroline Fusilier PA-C [facility]         Patient Instructions   Ice often for the first 24-48 hours, then may alternate ice and heat. TYLENOL (acetaminophen) 1000mg every 4-6 hours (max 4000mg/day) for pain. Ultram for breakthrough pain as needed up to every 6 hours (may cause drowsiness). Steroid (prednisone) for inflammation - may cause anxiety, insomnia, fluid retention, elevated blood pressure. Please discontinue the steroid if severe side effects.    Follow up with your doctor for any persistent or worsening pain.

## 2022-03-30 ENCOUNTER — TELEPHONE (OUTPATIENT)
Dept: URGENT CARE | Facility: CLINIC | Age: 57
End: 2022-03-30
Payer: MEDICARE

## 2022-03-30 NOTE — TELEPHONE ENCOUNTER
Courtesy call to patient. States she is doing exercises and taking her medication. She states she seems to be getting better and she will continue to do her routine.